# Patient Record
Sex: FEMALE | Race: WHITE | URBAN - METROPOLITAN AREA
[De-identification: names, ages, dates, MRNs, and addresses within clinical notes are randomized per-mention and may not be internally consistent; named-entity substitution may affect disease eponyms.]

---

## 2019-04-26 ENCOUNTER — OFFICE VISIT (OUTPATIENT)
Dept: FAMILY MEDICINE | Facility: CLINIC | Age: 29
End: 2019-04-26
Payer: COMMERCIAL

## 2019-04-26 ENCOUNTER — ANCILLARY PROCEDURE (OUTPATIENT)
Dept: CT IMAGING | Facility: CLINIC | Age: 29
End: 2019-04-26
Attending: NURSE PRACTITIONER
Payer: COMMERCIAL

## 2019-04-26 VITALS
HEART RATE: 87 BPM | TEMPERATURE: 97.6 F | BODY MASS INDEX: 30.3 KG/M2 | OXYGEN SATURATION: 98 % | SYSTOLIC BLOOD PRESSURE: 114 MMHG | DIASTOLIC BLOOD PRESSURE: 70 MMHG | HEIGHT: 63 IN | WEIGHT: 171 LBS

## 2019-04-26 DIAGNOSIS — R30.0 DYSURIA: ICD-10-CM

## 2019-04-26 DIAGNOSIS — B96.89 BV (BACTERIAL VAGINOSIS): ICD-10-CM

## 2019-04-26 DIAGNOSIS — R10.9 ACUTE RIGHT FLANK PAIN: Primary | ICD-10-CM

## 2019-04-26 DIAGNOSIS — R10.9 ACUTE RIGHT FLANK PAIN: ICD-10-CM

## 2019-04-26 DIAGNOSIS — N20.0 RENAL CALCULUS, RIGHT: ICD-10-CM

## 2019-04-26 DIAGNOSIS — N76.0 BV (BACTERIAL VAGINOSIS): ICD-10-CM

## 2019-04-26 PROBLEM — N89.8 VAGINAL ITCHING: Status: ACTIVE | Noted: 2019-04-26

## 2019-04-26 PROBLEM — N89.8 VAGINAL ITCHING: Status: RESOLVED | Noted: 2019-04-26 | Resolved: 2019-04-26

## 2019-04-26 LAB
ALBUMIN UR-MCNC: NEGATIVE MG/DL
APPEARANCE UR: CLEAR
BASOPHILS # BLD AUTO: 0 10E9/L (ref 0–0.2)
BASOPHILS NFR BLD AUTO: 0.4 %
BILIRUB UR QL STRIP: NEGATIVE
COLOR UR AUTO: YELLOW
DIFFERENTIAL METHOD BLD: NORMAL
EOSINOPHIL # BLD AUTO: 0.2 10E9/L (ref 0–0.7)
EOSINOPHIL NFR BLD AUTO: 2.1 %
ERYTHROCYTE [DISTWIDTH] IN BLOOD BY AUTOMATED COUNT: 13 % (ref 10–15)
GLUCOSE UR STRIP-MCNC: NEGATIVE MG/DL
HCT VFR BLD AUTO: 38.3 % (ref 35–47)
HGB BLD-MCNC: 12.6 G/DL (ref 11.7–15.7)
HGB UR QL STRIP: NEGATIVE
KETONES UR STRIP-MCNC: NEGATIVE MG/DL
LEUKOCYTE ESTERASE UR QL STRIP: ABNORMAL
LYMPHOCYTES # BLD AUTO: 2.4 10E9/L (ref 0.8–5.3)
LYMPHOCYTES NFR BLD AUTO: 26.5 %
MCH RBC QN AUTO: 29 PG (ref 26.5–33)
MCHC RBC AUTO-ENTMCNC: 32.9 G/DL (ref 31.5–36.5)
MCV RBC AUTO: 88 FL (ref 78–100)
MONOCYTES # BLD AUTO: 0.7 10E9/L (ref 0–1.3)
MONOCYTES NFR BLD AUTO: 7.7 %
NEUTROPHILS # BLD AUTO: 5.8 10E9/L (ref 1.6–8.3)
NEUTROPHILS NFR BLD AUTO: 63.3 %
NITRATE UR QL: NEGATIVE
NON-SQ EPI CELLS #/AREA URNS LPF: NORMAL /LPF
PH UR STRIP: 5.5 PH (ref 5–7)
PLATELET # BLD AUTO: 235 10E9/L (ref 150–450)
RBC # BLD AUTO: 4.34 10E12/L (ref 3.8–5.2)
RBC #/AREA URNS AUTO: NORMAL /HPF
SOURCE: ABNORMAL
SP GR UR STRIP: <=1.005 (ref 1–1.03)
SPECIMEN SOURCE: ABNORMAL
UROBILINOGEN UR STRIP-ACNC: 0.2 EU/DL (ref 0.2–1)
WBC # BLD AUTO: 9.1 10E9/L (ref 4–11)
WBC #/AREA URNS AUTO: NORMAL /HPF
WET PREP SPEC: ABNORMAL

## 2019-04-26 PROCEDURE — 85025 COMPLETE CBC W/AUTO DIFF WBC: CPT | Performed by: NURSE PRACTITIONER

## 2019-04-26 PROCEDURE — 87086 URINE CULTURE/COLONY COUNT: CPT | Performed by: NURSE PRACTITIONER

## 2019-04-26 PROCEDURE — 74176 CT ABD & PELVIS W/O CONTRAST: CPT | Mod: TC

## 2019-04-26 PROCEDURE — 87210 SMEAR WET MOUNT SALINE/INK: CPT | Performed by: NURSE PRACTITIONER

## 2019-04-26 PROCEDURE — 81001 URINALYSIS AUTO W/SCOPE: CPT | Performed by: NURSE PRACTITIONER

## 2019-04-26 PROCEDURE — 99214 OFFICE O/P EST MOD 30 MIN: CPT | Performed by: NURSE PRACTITIONER

## 2019-04-26 PROCEDURE — 36415 COLL VENOUS BLD VENIPUNCTURE: CPT | Performed by: NURSE PRACTITIONER

## 2019-04-26 RX ORDER — NORELGESTROMIN AND ETHINYL ESTRADIOL 35; 150 UG/MG; UG/MG
1 PATCH TRANSDERMAL WEEKLY
COMMUNITY
Start: 2018-07-11 | End: 2019-07-11

## 2019-04-26 RX ORDER — HYDROCODONE BITARTRATE AND ACETAMINOPHEN 5; 325 MG/1; MG/1
1 TABLET ORAL EVERY 6 HOURS PRN
Qty: 10 TABLET | Refills: 0 | Status: SHIPPED | OUTPATIENT
Start: 2019-04-26 | End: 2019-04-29

## 2019-04-26 RX ORDER — IBUPROFEN 600 MG/1
600 TABLET, FILM COATED ORAL EVERY 6 HOURS PRN
Qty: 30 TABLET | Refills: 1 | Status: SHIPPED | OUTPATIENT
Start: 2019-04-26

## 2019-04-26 RX ORDER — METRONIDAZOLE 7.5 MG/G
1 GEL VAGINAL DAILY
Qty: 70 G | Refills: 0 | Status: SHIPPED | OUTPATIENT
Start: 2019-04-26 | End: 2019-05-01

## 2019-04-26 ASSESSMENT — PAIN SCALES - GENERAL: PAINLEVEL: NO PAIN (0)

## 2019-04-26 ASSESSMENT — MIFFLIN-ST. JEOR: SCORE: 1473.74

## 2019-04-26 NOTE — PROGRESS NOTES
"Tone   SUBJECTIVE:   Estela Chen is a 28 year old female who presents to clinic today for the following   health issues:      URINARY TRACT SYMPTOMS      Duration: 7 days    Description  Urinary pain    Intensity:  mild    Accompanying signs and symptoms:  Fever/chills: YES- chills  Flank pain YES  Nausea and vomiting: YES- sometimes nausea  Vaginal symptoms: itching  Abdominal/Pelvic Pain: no     History  History of frequent UTI's: No  History of kidney stones: no   Sexually Active: YES  Possibility of pregnancy: No    Precipitating or alleviating factors: None    Therapies tried and outcome: Azo and Monistat   Outcome: not help    Patient notes dysuria, which began 7 days ago and has progressed to right back and right lower quadrant pain. No prior history of renal calculi.      Additional history: as documented    Reviewed  and updated as needed this visit by clinical staff         Reviewed and updated as needed this visit by Provider         Patient Active Problem List   Diagnosis   (none) - all problems resolved or deleted     No past surgical history on file.    Social History     Tobacco Use     Smoking status: Never Smoker     Smokeless tobacco: Never Used   Substance Use Topics     Alcohol use: Not on file     No family history on file.        ROS:  Constitutional, HEENT, cardiovascular, pulmonary, gi and gu systems are negative, except as otherwise noted.    OBJECTIVE:     /70 (BP Location: Right arm, Patient Position: Chair, Cuff Size: Adult Regular)   Pulse 87   Temp 97.6  F (36.4  C) (Oral)   Ht 1.607 m (5' 3.25\")   Wt 77.6 kg (171 lb)   LMP 04/10/2019   SpO2 98%   BMI 30.05 kg/m    Body mass index is 30.05 kg/m .  GENERAL: healthy, alert and no distress  ABDOMEN: tenderness RLQ and right flank, no organomegaly or masses and bowel sounds normal  BACK: Right CVA tenderness    Diagnostic Test Results:  Results for orders placed or performed in visit on 04/26/19 (from the past 24 hour(s)) "   UA reflex to Microscopic and Culture   Result Value Ref Range    Color Urine Yellow     Appearance Urine Clear     Glucose Urine Negative NEG^Negative mg/dL    Bilirubin Urine Negative NEG^Negative    Ketones Urine Negative NEG^Negative mg/dL    Specific Gravity Urine <=1.005 1.003 - 1.035    Blood Urine Negative NEG^Negative    pH Urine 5.5 5.0 - 7.0 pH    Protein Albumin Urine Negative NEG^Negative mg/dL    Urobilinogen Urine 0.2 0.2 - 1.0 EU/dL    Nitrite Urine Negative NEG^Negative    Leukocyte Esterase Urine Trace (A) NEG^Negative    Source Midstream Urine    Wet prep   Result Value Ref Range    Specimen Description Vagina     Wet Prep No Trichomonas seen     Wet Prep Clue cells seen (A)     Wet Prep No yeast seen     Wet Prep Rare  WBC'S seen      Urine Microscopic   Result Value Ref Range    WBC Urine 0 - 5 OTO5^0 - 5 /HPF    RBC Urine O - 2 OTO2^O - 2 /HPF    Squamous Epithelial /LPF Urine Few FEW^Few /LPF   CBC with platelets differential   Result Value Ref Range    WBC 9.1 4.0 - 11.0 10e9/L    RBC Count 4.34 3.8 - 5.2 10e12/L    Hemoglobin 12.6 11.7 - 15.7 g/dL    Hematocrit 38.3 35.0 - 47.0 %    MCV 88 78 - 100 fl    MCH 29.0 26.5 - 33.0 pg    MCHC 32.9 31.5 - 36.5 g/dL    RDW 13.0 10.0 - 15.0 %    Platelet Count 235 150 - 450 10e9/L    % Neutrophils 63.3 %    % Lymphocytes 26.5 %    % Monocytes 7.7 %    % Eosinophils 2.1 %    % Basophils 0.4 %    Absolute Neutrophil 5.8 1.6 - 8.3 10e9/L    Absolute Lymphocytes 2.4 0.8 - 5.3 10e9/L    Absolute Monocytes 0.7 0.0 - 1.3 10e9/L    Absolute Eosinophils 0.2 0.0 - 0.7 10e9/L    Absolute Basophils 0.0 0.0 - 0.2 10e9/L    Diff Method Automated Method        ASSESSMENT/PLAN:     1. Acute right flank pain  No evidence of infection and worrisome for renal/ureteral stone. Recommend CT today and [place] given to use for severe pain. Will also send urine for culture.    ADDENDUM:  Patient has 3 mm nonobstructing stone.  She will push fluids and strain urine over the  weekend.  To Emergency Room if intractable pain over the weekend.  Ok to call me for refill of [place] on Monday if needed.  Follow-up 1 week if not improving.    - Urine Culture Aerobic Bacterial  - CBC with platelets differential  - HYDROcodone-acetaminophen (NORCO) 5-325 MG tablet; Take 1 tablet by mouth every 6 hours as needed for severe pain  Dispense: 10 tablet; Refill: 0  - CT Abdomen Pelvis w/o Contrast; Future  - ibuprofen (ADVIL/MOTRIN) 600 MG tablet; Take 1 tablet (600 mg) by mouth every 6 hours as needed for moderate pain  Dispense: 30 tablet; Refill: 1    2. Dysuria    - UA reflex to Microscopic and Culture  - Urine Microscopic  - Urine Culture Aerobic Bacterial    3. BV (bacterial vaginosis)    - Wet prep  - metroNIDAZOLE (METROGEL) 0.75 % vaginal gel; Place 1 applicator (5 g) vaginally daily for 5 days  Dispense: 70 g; Refill: 0    See Patient Instructions    MUNIRA Borges [facility]

## 2019-04-27 LAB
BACTERIA SPEC CULT: NORMAL
SPECIMEN SOURCE: NORMAL

## 2019-04-29 ENCOUNTER — TELEPHONE (OUTPATIENT)
Dept: FAMILY MEDICINE | Facility: CLINIC | Age: 29
End: 2019-04-29

## 2019-04-29 DIAGNOSIS — N20.0 RENAL CALCULUS, RIGHT: Primary | ICD-10-CM

## 2019-04-29 DIAGNOSIS — R10.9 ACUTE RIGHT FLANK PAIN: ICD-10-CM

## 2019-04-29 RX ORDER — HYDROCODONE BITARTRATE AND ACETAMINOPHEN 5; 325 MG/1; MG/1
1 TABLET ORAL EVERY 6 HOURS PRN
Qty: 15 TABLET | Refills: 0 | Status: SHIPPED | OUTPATIENT
Start: 2019-04-29

## 2019-04-29 NOTE — TELEPHONE ENCOUNTER
Let's give the stone more time- if not improving over the next week or if pain worsens, will have her see Urology next week. Referral placed.  I can refill #15 norco if she is needing more.    Elsy Chaney, CNP

## 2019-04-29 NOTE — TELEPHONE ENCOUNTER
Called and confirmed with patient she would like this taken to the [facility].    Walked paper prescription down to [facility]. Janny Motta,

## 2019-04-29 NOTE — TELEPHONE ENCOUNTER
Called and spoke with patient & notified of provider's information  as written.   Verbalizes understanding & no further questions.     Patient would like new Rx for Norco.   Will  at     Julia Verduzco RN

## 2019-04-29 NOTE — RESULT ENCOUNTER NOTE
Please call the patient with these results:    Estela, the urine culture shows mixed julita only which is due to a contaminated specimen and not a UTI.  How are you doing in regard to pain control? Do you think you've passed the stone?    Elsy Chaney, CNP

## 2019-04-29 NOTE — TELEPHONE ENCOUNTER
Patient notified of providers message as written.  Patient states she has been straining her urine and no stones have passed.  She still has the same pain in lower right back, it is not any better or worse.   Please advise   Belen Whatley RN

## 2019-04-29 NOTE — TELEPHONE ENCOUNTER
Notes recorded by Julia Verduzco RN on 4/29/2019 at 8:50 AM CDT  Left message for patient to call RN hotline [phone].   See TE.     Julia Verduzco RN  ------    Notes recorded by Elsy Chaney APRN CNP on 4/29/2019 at 8:26 AM CDT  Please call the patient with these results:    Estela, the urine culture shows mixed julita only which is due to a contaminated specimen and not a UTI.  How are you doing in regard to pain control? Do you think you've passed the stone?    Elsy Chaney CNP  ------    Notes recorded by Elsy Chaney APRN CNP on 4/26/2019 at 2:58 PM CDT  Results discussed directly with patient while patient was present. Any further details documented in the note.     MUNIRA Borges CNP, RN

## 2019-09-04 ENCOUNTER — TRANSFERRED RECORDS (OUTPATIENT)
Dept: HEALTH INFORMATION MANAGEMENT | Facility: CLINIC | Age: 29
End: 2019-09-04

## 2019-09-04 LAB
HBA1C MFR BLD: 5.2 % (ref 0–5.7)
HEP C HIM: NORMAL
PAP-ABSTRACT: NORMAL

## 2020-01-06 ENCOUNTER — TELEPHONE (OUTPATIENT)
Dept: FAMILY MEDICINE | Facility: CLINIC | Age: 30
End: 2020-01-06

## 2020-01-06 NOTE — TELEPHONE ENCOUNTER
Please abstract the following data from this visit with this patient into the appropriate field in Epic:    Tests that can be patient reported without a hard copy:      Other Tests found in the patient's chart through Chart Review/Care Everywhere:    Pap smear done by [facility] this date: 9/4/19    Note to [employer]: If this section is blank, no results were found via Chart Review/Care Everywhere.

## 2021-02-24 NOTE — RESULT ENCOUNTER NOTE
I spoke with the Patient via telephone call and communicated these results.   She will push fluids and strain urine over the weekend.  To Emergency Room if intractable pain over the weekend.  Ok to call me for refill of [place] on Monday if needed.  Follow-up 1 week if not improving.    Elsy Chaney   May do either PA or therapeutic equivalent at pharmacy